# Patient Record
Sex: FEMALE | Race: WHITE
[De-identification: names, ages, dates, MRNs, and addresses within clinical notes are randomized per-mention and may not be internally consistent; named-entity substitution may affect disease eponyms.]

---

## 2020-04-25 ENCOUNTER — HOSPITAL ENCOUNTER (EMERGENCY)
Dept: HOSPITAL 4 - SED | Age: 45
LOS: 1 days | Discharge: HOME | End: 2020-04-26
Payer: MEDICAID

## 2020-04-25 VITALS — SYSTOLIC BLOOD PRESSURE: 117 MMHG

## 2020-04-25 VITALS — BODY MASS INDEX: 24.29 KG/M2 | WEIGHT: 132 LBS | HEIGHT: 62 IN

## 2020-04-25 DIAGNOSIS — F28: Primary | ICD-10-CM

## 2020-04-25 DIAGNOSIS — F15.10: ICD-10-CM

## 2020-04-25 DIAGNOSIS — F20.9: ICD-10-CM

## 2020-04-25 PROCEDURE — 81025 URINE PREGNANCY TEST: CPT

## 2020-04-25 PROCEDURE — 99283 EMERGENCY DEPT VISIT LOW MDM: CPT

## 2020-04-25 PROCEDURE — 96372 THER/PROPH/DIAG INJ SC/IM: CPT

## 2020-04-26 VITALS — SYSTOLIC BLOOD PRESSURE: 117 MMHG

## 2020-05-05 ENCOUNTER — HOSPITAL ENCOUNTER (EMERGENCY)
Dept: HOSPITAL 4 - SED | Age: 45
Discharge: HOME | End: 2020-05-05
Payer: MEDICAID

## 2020-05-05 VITALS — SYSTOLIC BLOOD PRESSURE: 120 MMHG

## 2020-05-05 VITALS — WEIGHT: 118 LBS | BODY MASS INDEX: 23.79 KG/M2 | HEIGHT: 59 IN

## 2020-05-05 VITALS — SYSTOLIC BLOOD PRESSURE: 122 MMHG

## 2020-05-05 DIAGNOSIS — F20.9: ICD-10-CM

## 2020-05-05 DIAGNOSIS — F41.9: ICD-10-CM

## 2020-05-05 DIAGNOSIS — F32.9: ICD-10-CM

## 2020-05-05 DIAGNOSIS — M54.9: Primary | ICD-10-CM

## 2020-05-05 NOTE — NUR
Road Test given by [place], EMT. Pt. has steady gait and is able to ambulate 
without assitance. Taxi called and ETA is 6:15.

## 2020-05-05 NOTE — NUR
Patient given written and verbal discharge instructions and verbalizes 
understanding. ER MD discussed with patient the results and treatment provided. 
Patient in stable condition. ID arm band removed.

Rx of Alprazolam given. Patient educated on pain management and to follow up 
with PMD. Pain Scale 0/10.

Opportunity for questions provided and answered. Medication side effect fact 
sheet provided.

## 2020-05-05 NOTE — NUR
Pt. brought in BLS via [employer]. She was found in a [employer] parking lot with c/o 
lower back pain. Pain is 7/10.  Pt. has hx of anxiety and schizophrenia. She is 
ETOH. She is non compliant with her Seroquel. She is A&O x3, skin is dry and 
intact. No other complaints per pt. or as noted. Will continue to monitor.

## 2020-05-05 NOTE — NUR
Pt. picked up by adriane and staff assisted pt. to car to ensure she had a ride. 
She is being taken to [facility] in [place].

## 2020-05-05 NOTE — NUR
Pt. given meds as ordered. Pt. states that she is experiencing anxiety and 
takes Alprazolam to manage it. Dr. Davis notified and does not want to give any 
anxiety medications at this time.

## 2020-05-06 ENCOUNTER — HOSPITAL ENCOUNTER (EMERGENCY)
Dept: HOSPITAL 4 - SED | Age: 45
Discharge: HOME | End: 2020-05-06
Payer: MEDICAID

## 2020-05-06 VITALS — BODY MASS INDEX: 23.79 KG/M2 | WEIGHT: 118 LBS | HEIGHT: 59 IN | SYSTOLIC BLOOD PRESSURE: 172 MMHG

## 2020-05-06 VITALS — SYSTOLIC BLOOD PRESSURE: 148 MMHG

## 2020-05-06 DIAGNOSIS — F20.9: ICD-10-CM

## 2020-05-06 DIAGNOSIS — F15.10: ICD-10-CM

## 2020-05-06 DIAGNOSIS — F41.9: ICD-10-CM

## 2020-05-06 DIAGNOSIS — Z71.6: ICD-10-CM

## 2020-05-06 DIAGNOSIS — F17.210: ICD-10-CM

## 2020-05-06 DIAGNOSIS — F29: Primary | ICD-10-CM

## 2020-05-06 LAB
ALBUMIN SERPL BCP-MCNC: 3.9 G/DL (ref 3.4–4.8)
ALT SERPL W P-5'-P-CCNC: 69 U/L (ref 12–78)
AMPHETAMINES UR QL SCN: POSITIVE
ANION GAP SERPL CALCULATED.3IONS-SCNC: 9 MMOL/L (ref 5–15)
APAP SERPL-MCNC: < 1 UG/ML (ref 1–30)
APPEARANCE UR: CLEAR
AST SERPL W P-5'-P-CCNC: 81 U/L (ref 10–37)
BACTERIA URNS QL MICRO: (no result) /HPF
BARBITURATES UR QL SCN: NEGATIVE
BASOPHILS # BLD AUTO: 0.1 K/UL (ref 0–0.2)
BASOPHILS NFR BLD AUTO: 0.9 % (ref 0–2)
BENZODIAZ UR QL SCN: NEGATIVE
BILIRUB SERPL-MCNC: 0.7 MG/DL (ref 0–1)
BILIRUB UR QL STRIP: (no result)
BUN SERPL-MCNC: 7 MG/DL (ref 8–21)
BZE UR QL SCN: NEGATIVE
CALCIUM SERPL-MCNC: 8.8 MG/DL (ref 8.4–11)
CANNABINOIDS UR QL SCN: NEGATIVE
CHLORIDE SERPL-SCNC: 93 MMOL/L (ref 98–107)
CK MB SERPL-CCNC: 4 NG/ML (ref 0–3.6)
CK MB SERPL-RTO: 2 % (ref 0–2.9)
COLOR UR: YELLOW
CREAT SERPL-MCNC: 0.65 MG/DL (ref 0.55–1.3)
EOSINOPHIL # BLD AUTO: 0 K/UL (ref 0–0.4)
EOSINOPHIL NFR BLD AUTO: 0 % (ref 0–4)
ERYTHROCYTE [DISTWIDTH] IN BLOOD BY AUTOMATED COUNT: 21.5 % (ref 9–15)
ETHANOL SERPL-MCNC: 28 MG/DL (ref ?–10)
GFR SERPL CREATININE-BSD FRML MDRD: 128 ML/MIN (ref 90–?)
GLUCOSE SERPL-MCNC: 108 MG/DL (ref 70–99)
GLUCOSE UR STRIP-MCNC: NEGATIVE MG/DL
HCG SERPL-ACNC: 3 MIU/ML (ref 0–6)
HCT VFR BLD AUTO: 30.7 % (ref 36–48)
HGB BLD-MCNC: 10 G/DL (ref 12–16)
HGB UR QL STRIP: (no result)
KETONES UR STRIP-MCNC: NEGATIVE MG/DL
LEUKOCYTE ESTERASE UR QL STRIP: NEGATIVE
LYMPHOCYTES # BLD AUTO: 1.2 K/UL (ref 1–5.5)
LYMPHOCYTES NFR BLD AUTO: 17.2 % (ref 20.5–51.5)
MCH RBC QN AUTO: 25 PG (ref 27–31)
MCHC RBC AUTO-ENTMCNC: 33 % (ref 32–36)
MCV RBC AUTO: 77 FL (ref 79–98)
METHADONE UR-SCNC: NEGATIVE UMOL/L
METHAMPHET UR-SCNC: NEGATIVE UMOL/L
MONOCYTES # BLD MANUAL: 0.2 K/UL (ref 0–1)
MONOCYTES # BLD MANUAL: 3.4 % (ref 1.7–9.3)
NEUTROPHILS # BLD AUTO: 5.5 K/UL (ref 1.8–7.7)
NEUTROPHILS NFR BLD AUTO: 78.5 % (ref 40–70)
NITRITE UR QL STRIP: NEGATIVE
OPIATES UR QL SCN: NEGATIVE
OXYCODONE SERPL-MCNC: NEGATIVE NG/ML
PCP UR QL SCN: NEGATIVE
PH UR STRIP: 6 [PH] (ref 5–8)
PLATELET # BLD AUTO: 375 K/UL (ref 130–430)
POTASSIUM SERPL-SCNC: 3.2 MMOL/L (ref 3.5–5.1)
PROT UR QL STRIP: (no result)
RBC # BLD AUTO: 3.97 MIL/UL (ref 4.2–6.2)
RBC #/AREA URNS HPF: (no result) /HPF (ref 0–3)
SODIUM SERPLBLD-SCNC: 130 MMOL/L (ref 136–145)
SP GR UR STRIP: >=1.03 (ref 1–1.03)
TRICYCLICS UR-MCNC: NEGATIVE NG/ML
URINE PROPOXYPHENE SCREEN: NEGATIVE
UROBILINOGEN UR STRIP-MCNC: 1 MG/DL (ref 0.2–1)
WBC # BLD AUTO: 7 K/UL (ref 4.8–10.8)
WBC #/AREA URNS HPF: (no result) /HPF (ref 0–3)

## 2020-05-06 PROCEDURE — 82553 CREATINE MB FRACTION: CPT

## 2020-05-06 PROCEDURE — 80307 DRUG TEST PRSMV CHEM ANLYZR: CPT

## 2020-05-06 PROCEDURE — 96375 TX/PRO/DX INJ NEW DRUG ADDON: CPT

## 2020-05-06 PROCEDURE — 96365 THER/PROPH/DIAG IV INF INIT: CPT

## 2020-05-06 PROCEDURE — 36415 COLL VENOUS BLD VENIPUNCTURE: CPT

## 2020-05-06 PROCEDURE — 99284 EMERGENCY DEPT VISIT MOD MDM: CPT

## 2020-05-06 PROCEDURE — 96372 THER/PROPH/DIAG INJ SC/IM: CPT

## 2020-05-06 PROCEDURE — G0480 DRUG TEST DEF 1-7 CLASSES: HCPCS

## 2020-05-06 PROCEDURE — 82550 ASSAY OF CK (CPK): CPT

## 2020-05-06 PROCEDURE — 85025 COMPLETE CBC W/AUTO DIFF WBC: CPT

## 2020-05-06 PROCEDURE — 87086 URINE CULTURE/COLONY COUNT: CPT

## 2020-05-06 PROCEDURE — 93005 ELECTROCARDIOGRAM TRACING: CPT

## 2020-05-06 PROCEDURE — G0481 DRUG TEST DEF 8-14 CLASSES: HCPCS

## 2020-05-06 PROCEDURE — 84702 CHORIONIC GONADOTROPIN TEST: CPT

## 2020-05-06 PROCEDURE — 96366 THER/PROPH/DIAG IV INF ADDON: CPT

## 2020-05-06 PROCEDURE — 80053 COMPREHEN METABOLIC PANEL: CPT

## 2020-05-06 PROCEDURE — 81000 URINALYSIS NONAUTO W/SCOPE: CPT

## 2020-05-06 PROCEDURE — G0482 DRUG TEST DEF 15-21 CLASSES: HCPCS

## 2020-05-06 NOTE — NUR
Patient given written and verbal discharge instructions and verbalizes 
understanding.  ER MD discussed with patient the results and treatment 
provided. Patient in stable condition. ID arm band removed. 

No Rx given. Patient educated on pain management and to follow up with PMD. 
Pain Scale 2/10 .

Opportunity for questions provided and answered. Medication side effect fact 
sheet provided.

## 2020-05-06 NOTE — NUR
-------------------------------------------------------------------------------

            *** Note undone in EDM - 05/06/20 at 0445 by JAZMIN ***            

-------------------------------------------------------------------------------

patient BIB BLS complaining that the "voices won't stop" and she is scared. 
Patient denies the voices are telling her to harm herself, SI or HI. Patient 
was discharged on 5/5/2020 at 5:11pm and has drank 3 tall can beers since then. 
Patient was prescribed alprazolam for her back pain but did not fill her 
prescription. Patient has a history of schizophrenia, anxiety, chronic neck and 
back pain. Patient does not have any other medical complaints at this time. 
Will continue to monitor.

## 2020-05-06 NOTE — NUR
Patient receieved benadryl 25mg per MD order. Patient is now sleeping 
comfortably in bed without any signs of distress. Patients vital signs are 
stable.

## 2020-05-06 NOTE — NUR
patient BIB BLS complaining that the "voices won't stop" and she is scared. 
Patient states she misplaced her seroquel. Patient denies the voices are 
telling her to harm herself, SI or HI. Patient was discharged on 5/5/2020 at 
5:11pm and has drank 3 tall can beers since then. Patient was prescribed 
alprazolam for her back pain but did not fill her prescription. Patient has a 
history of schizophrenia, anxiety, chronic neck and back pain. Patient does not 
have any other medical complaints at this time. Will continue to monitor.

## 2020-05-06 NOTE — NUR
Upon discharge patient states "I dont feel good, I don't want to go home can I 
talk to the doctor" made Dr. Quinones aware.